# Patient Record
Sex: FEMALE | Race: WHITE | Employment: OTHER | URBAN - METROPOLITAN AREA
[De-identification: names, ages, dates, MRNs, and addresses within clinical notes are randomized per-mention and may not be internally consistent; named-entity substitution may affect disease eponyms.]

---

## 2017-09-25 ENCOUNTER — OFFICE VISIT (OUTPATIENT)
Dept: OBGYN CLINIC | Facility: CLINIC | Age: 72
End: 2017-09-25

## 2017-09-25 ENCOUNTER — HOSPITAL ENCOUNTER (OUTPATIENT)
Dept: ULTRASOUND IMAGING | Age: 72
Discharge: HOME | End: 2017-09-25
Attending: OBSTETRICS & GYNECOLOGY
Payer: MEDICARE

## 2017-09-25 VITALS — HEIGHT: 62 IN | WEIGHT: 193.63 LBS | BODY MASS INDEX: 35.63 KG/M2

## 2017-09-25 DIAGNOSIS — R10.2 PELVIC PAIN: Primary | ICD-10-CM

## 2017-09-25 DIAGNOSIS — Z12.4 ROUTINE CERVICAL SMEAR: ICD-10-CM

## 2017-09-25 DIAGNOSIS — R10.2 PELVIC PAIN: ICD-10-CM

## 2017-09-25 PROCEDURE — 76856 US EXAM PELVIC COMPLETE: CPT | Performed by: OBSTETRICS & GYNECOLOGY

## 2017-09-25 PROCEDURE — 87624 HPV HI-RISK TYP POOLED RSLT: CPT | Performed by: OBSTETRICS & GYNECOLOGY

## 2017-09-25 PROCEDURE — 76830 TRANSVAGINAL US NON-OB: CPT | Performed by: OBSTETRICS & GYNECOLOGY

## 2017-09-25 PROCEDURE — 99203 OFFICE O/P NEW LOW 30 MIN: CPT | Performed by: OBSTETRICS & GYNECOLOGY

## 2017-09-25 PROCEDURE — 88175 CYTOPATH C/V AUTO FLUID REDO: CPT | Performed by: OBSTETRICS & GYNECOLOGY

## 2017-09-25 NOTE — PROGRESS NOTES
GYN H&P     9/25/2017  12:32 PM    CC: Patient is here to establish care    HPI: Patient is a 74year old [id number] LMP age 37 self-referred with concerns about increasing right-sided pelvic pain over the course of this month.  No bleeding or abnormal dischar None on file     Social History Narrative   None on file          Ht 62\"   Wt 193 lb 9.6 oz   BMI 35.41 kg/m²     Exam:   GENERAL: well developed, well nourished, in no apparent distress  SKIN: no rashes, no lesions  HEENT: normal  LUNGS: respiration un

## 2017-09-26 LAB — HPV I/H RISK 1 DNA SPEC QL NAA+PROBE: NEGATIVE

## 2017-09-27 ENCOUNTER — TELEPHONE (OUTPATIENT)
Dept: OBGYN CLINIC | Facility: CLINIC | Age: 72
End: 2017-09-27

## 2017-09-27 NOTE — TELEPHONE ENCOUNTER
Spoke with patient about USN finding showing slight thickening of EM lining and need for EMBx. Informed that remainder of pelvic USN normal for uterus, right, and left ovaries other than small 1 cm posterior uterine fibroid.  Will call and make EMBx appoint

## 2017-09-28 ENCOUNTER — PATIENT MESSAGE (OUTPATIENT)
Dept: OBGYN CLINIC | Facility: CLINIC | Age: 72
End: 2017-09-28

## 2017-09-28 ENCOUNTER — TELEPHONE (OUTPATIENT)
Dept: OBGYN CLINIC | Facility: CLINIC | Age: 72
End: 2017-09-28

## 2017-09-28 NOTE — TELEPHONE ENCOUNTER
Pt c/o increased pain in  lower right side of pelvis since office visit on 09/25. Also has frequency of urination, going every 1/2 hour since this morning. Denies bleeding. Has an appointment for this Monday 10/02 for endometrial biopsy.  Instructed by

## 2017-09-29 ENCOUNTER — TELEPHONE (OUTPATIENT)
Dept: OBGYN CLINIC | Facility: CLINIC | Age: 72
End: 2017-09-29

## 2017-09-29 NOTE — TELEPHONE ENCOUNTER
Pt calling c/o increased frequency in urination since call yesterday. Informed can come to office for nurse visit today,or immediate care center. Pt declined stating she will call her primary  To see if she can be treated there.

## 2017-10-02 ENCOUNTER — OFFICE VISIT (OUTPATIENT)
Dept: OBGYN CLINIC | Facility: CLINIC | Age: 72
End: 2017-10-02

## 2017-10-02 VITALS
HEIGHT: 62 IN | DIASTOLIC BLOOD PRESSURE: 70 MMHG | BODY MASS INDEX: 36.07 KG/M2 | WEIGHT: 196 LBS | SYSTOLIC BLOOD PRESSURE: 128 MMHG

## 2017-10-02 DIAGNOSIS — N95.0 POSTMENOPAUSAL BLEEDING: ICD-10-CM

## 2017-10-02 DIAGNOSIS — R93.89 THICKENED ENDOMETRIUM: Primary | ICD-10-CM

## 2017-10-02 DIAGNOSIS — N84.1 CERVICAL POLYP: ICD-10-CM

## 2017-10-02 PROCEDURE — 58100 BIOPSY OF UTERUS LINING: CPT | Performed by: OBSTETRICS & GYNECOLOGY

## 2017-10-02 PROCEDURE — 88305 TISSUE EXAM BY PATHOLOGIST: CPT | Performed by: OBSTETRICS & GYNECOLOGY

## 2017-10-02 RX ORDER — SULFAMETHOXAZOLE AND TRIMETHOPRIM 800; 160 MG/1; MG/1
TABLET ORAL
COMMUNITY
Start: 2017-09-29

## 2017-10-02 NOTE — PROGRESS NOTES
PROCEDURE NOTES FOR ENDOMETRIAL BIOPSY AND CERVICAL POLYPECTOMY:    Indication: Postmenopausal bleeding; Thickened EM lining on USN  Procedure explained. Risks and benefits reviewed. Consent obtained. Sterile speculum placed.  Betadine wash x 3 performed

## 2017-10-09 ENCOUNTER — TELEPHONE (OUTPATIENT)
Dept: OBGYN CLINIC | Facility: CLINIC | Age: 72
End: 2017-10-09

## 2017-10-09 NOTE — PROGRESS NOTES
Results given to pt.  Pt has some questions for Dr Leah Marshall, telephone message sent to Dr Leah Marshall,

## 2017-10-09 NOTE — TELEPHONE ENCOUNTER
Pt would like to speak with Dr Rachel Graham to go over ultrasound done 09/25/2017 at [address] has questions about pelvic pain.

## 2018-03-08 ENCOUNTER — TELEPHONE (OUTPATIENT)
Dept: OBGYN CLINIC | Facility: CLINIC | Age: 73
End: 2018-03-08

## (undated) NOTE — LETTER
Marisela Murillo, [facility]:4/94/1961    CONSENT FOR PROCEDURE/SEDATION    1. I authorize the performance upon Leslie Cook  the following: Endometrial Biopsy    2.  I authorize Dr. Sonia Villvaicencio MD (and whomever is designated as the doctor’s assistant), Witness: _________________________________________ Date:___________     Physician Signature: _______________________________ Date:___________